# Patient Record
Sex: FEMALE | Race: BLACK OR AFRICAN AMERICAN | NOT HISPANIC OR LATINO | URBAN - METROPOLITAN AREA
[De-identification: names, ages, dates, MRNs, and addresses within clinical notes are randomized per-mention and may not be internally consistent; named-entity substitution may affect disease eponyms.]

---

## 2024-07-30 ENCOUNTER — EMERGENCY (EMERGENCY)
Facility: HOSPITAL | Age: 39
LOS: 0 days | Discharge: HOME | End: 2024-07-31
Attending: EMERGENCY MEDICINE
Payer: SELF-PAY

## 2024-07-30 VITALS
SYSTOLIC BLOOD PRESSURE: 116 MMHG | RESPIRATION RATE: 18 BRPM | TEMPERATURE: 98 F | DIASTOLIC BLOOD PRESSURE: 68 MMHG | HEART RATE: 77 BPM | OXYGEN SATURATION: 100 %

## 2024-07-30 VITALS
DIASTOLIC BLOOD PRESSURE: 81 MMHG | HEART RATE: 87 BPM | OXYGEN SATURATION: 100 % | RESPIRATION RATE: 18 BRPM | SYSTOLIC BLOOD PRESSURE: 142 MMHG | TEMPERATURE: 99 F

## 2024-07-30 DIAGNOSIS — H53.8 OTHER VISUAL DISTURBANCES: ICD-10-CM

## 2024-07-30 DIAGNOSIS — R51.9 HEADACHE, UNSPECIFIED: ICD-10-CM

## 2024-07-30 DIAGNOSIS — R10.31 RIGHT LOWER QUADRANT PAIN: ICD-10-CM

## 2024-07-30 DIAGNOSIS — Z83.3 FAMILY HISTORY OF DIABETES MELLITUS: ICD-10-CM

## 2024-07-30 DIAGNOSIS — E11.65 TYPE 2 DIABETES MELLITUS WITH HYPERGLYCEMIA: ICD-10-CM

## 2024-07-30 LAB
ACETONE SERPL-MCNC: NEGATIVE — SIGNIFICANT CHANGE UP
ALBUMIN SERPL ELPH-MCNC: 3.8 G/DL — SIGNIFICANT CHANGE UP (ref 3.3–5)
ALP SERPL-CCNC: 72 U/L — SIGNIFICANT CHANGE UP (ref 40–120)
ALT FLD-CCNC: 29 U/L — SIGNIFICANT CHANGE UP (ref 12–78)
ANION GAP SERPL CALC-SCNC: 8 MMOL/L — SIGNIFICANT CHANGE UP (ref 5–17)
APPEARANCE UR: CLEAR — SIGNIFICANT CHANGE UP
AST SERPL-CCNC: 24 U/L — SIGNIFICANT CHANGE UP (ref 15–37)
BASE EXCESS BLDV CALC-SCNC: -2.9 MMOL/L — LOW (ref -2–3)
BASOPHILS # BLD AUTO: 0.03 K/UL — SIGNIFICANT CHANGE UP (ref 0–0.2)
BASOPHILS NFR BLD AUTO: 0.3 % — SIGNIFICANT CHANGE UP (ref 0–2)
BILIRUB SERPL-MCNC: 0.2 MG/DL — SIGNIFICANT CHANGE UP (ref 0.2–1.2)
BILIRUB UR-MCNC: NEGATIVE — SIGNIFICANT CHANGE UP
BUN SERPL-MCNC: 16 MG/DL — SIGNIFICANT CHANGE UP (ref 7–23)
CALCIUM SERPL-MCNC: 9.6 MG/DL — SIGNIFICANT CHANGE UP (ref 8.5–10.1)
CHLORIDE SERPL-SCNC: 102 MMOL/L — SIGNIFICANT CHANGE UP (ref 96–108)
CO2 SERPL-SCNC: 24 MMOL/L — SIGNIFICANT CHANGE UP (ref 22–31)
COLOR SPEC: YELLOW — SIGNIFICANT CHANGE UP
CREAT SERPL-MCNC: 1.11 MG/DL — SIGNIFICANT CHANGE UP (ref 0.5–1.3)
DIFF PNL FLD: NEGATIVE — SIGNIFICANT CHANGE UP
EGFR: 64 ML/MIN/1.73M2 — SIGNIFICANT CHANGE UP
EOSINOPHIL # BLD AUTO: 0.08 K/UL — SIGNIFICANT CHANGE UP (ref 0–0.5)
EOSINOPHIL NFR BLD AUTO: 0.8 % — SIGNIFICANT CHANGE UP (ref 0–6)
GAS PNL BLDV: SIGNIFICANT CHANGE UP
GLUCOSE SERPL-MCNC: 338 MG/DL — HIGH (ref 70–99)
GLUCOSE UR QL: >=1000 MG/DL
HCG SERPL-ACNC: <1 MIU/ML — SIGNIFICANT CHANGE UP
HCO3 BLDV-SCNC: 22 MMOL/L — SIGNIFICANT CHANGE UP (ref 22–29)
HCT VFR BLD CALC: 36.3 % — SIGNIFICANT CHANGE UP (ref 34.5–45)
HGB BLD-MCNC: 12.5 G/DL — SIGNIFICANT CHANGE UP (ref 11.5–15.5)
IMM GRANULOCYTES NFR BLD AUTO: 0.3 % — SIGNIFICANT CHANGE UP (ref 0–0.9)
KETONES UR-MCNC: ABNORMAL MG/DL
LEUKOCYTE ESTERASE UR-ACNC: NEGATIVE — SIGNIFICANT CHANGE UP
LYMPHOCYTES # BLD AUTO: 3.15 K/UL — SIGNIFICANT CHANGE UP (ref 1–3.3)
LYMPHOCYTES # BLD AUTO: 30.4 % — SIGNIFICANT CHANGE UP (ref 13–44)
MAGNESIUM SERPL-MCNC: 1.6 MG/DL — SIGNIFICANT CHANGE UP (ref 1.6–2.6)
MCHC RBC-ENTMCNC: 30.2 PG — SIGNIFICANT CHANGE UP (ref 27–34)
MCHC RBC-ENTMCNC: 34.4 GM/DL — SIGNIFICANT CHANGE UP (ref 32–36)
MCV RBC AUTO: 87.7 FL — SIGNIFICANT CHANGE UP (ref 80–100)
MONOCYTES # BLD AUTO: 0.48 K/UL — SIGNIFICANT CHANGE UP (ref 0–0.9)
MONOCYTES NFR BLD AUTO: 4.6 % — SIGNIFICANT CHANGE UP (ref 2–14)
NEUTROPHILS # BLD AUTO: 6.58 K/UL — SIGNIFICANT CHANGE UP (ref 1.8–7.4)
NEUTROPHILS NFR BLD AUTO: 63.6 % — SIGNIFICANT CHANGE UP (ref 43–77)
NITRITE UR-MCNC: NEGATIVE — SIGNIFICANT CHANGE UP
PCO2 BLDV: 38 MMHG — LOW (ref 39–42)
PH BLDV: 7.37 — SIGNIFICANT CHANGE UP (ref 7.32–7.43)
PH UR: 5.5 — SIGNIFICANT CHANGE UP (ref 5–8)
PLATELET # BLD AUTO: 360 K/UL — SIGNIFICANT CHANGE UP (ref 150–400)
PO2 BLDV: 73 MMHG — HIGH (ref 25–45)
POTASSIUM SERPL-MCNC: 4.4 MMOL/L — SIGNIFICANT CHANGE UP (ref 3.5–5.3)
POTASSIUM SERPL-SCNC: 4.4 MMOL/L — SIGNIFICANT CHANGE UP (ref 3.5–5.3)
PROT SERPL-MCNC: 8 GM/DL — SIGNIFICANT CHANGE UP (ref 6–8.3)
PROT UR-MCNC: NEGATIVE MG/DL — SIGNIFICANT CHANGE UP
RBC # BLD: 4.14 M/UL — SIGNIFICANT CHANGE UP (ref 3.8–5.2)
RBC # FLD: 11.9 % — SIGNIFICANT CHANGE UP (ref 10.3–14.5)
SAO2 % BLDV: 84 % — SIGNIFICANT CHANGE UP (ref 67–88)
SODIUM SERPL-SCNC: 134 MMOL/L — LOW (ref 135–145)
SP GR SPEC: 1.02 — SIGNIFICANT CHANGE UP (ref 1–1.03)
UROBILINOGEN FLD QL: 0.2 MG/DL — SIGNIFICANT CHANGE UP (ref 0.2–1)
WBC # BLD: 10.35 K/UL — SIGNIFICANT CHANGE UP (ref 3.8–10.5)
WBC # FLD AUTO: 10.35 K/UL — SIGNIFICANT CHANGE UP (ref 3.8–10.5)

## 2024-07-30 PROCEDURE — 85025 COMPLETE CBC W/AUTO DIFF WBC: CPT

## 2024-07-30 PROCEDURE — 93010 ELECTROCARDIOGRAM REPORT: CPT

## 2024-07-30 PROCEDURE — 81003 URINALYSIS AUTO W/O SCOPE: CPT

## 2024-07-30 PROCEDURE — 82962 GLUCOSE BLOOD TEST: CPT

## 2024-07-30 PROCEDURE — 99285 EMERGENCY DEPT VISIT HI MDM: CPT | Mod: 25

## 2024-07-30 PROCEDURE — 80053 COMPREHEN METABOLIC PANEL: CPT

## 2024-07-30 PROCEDURE — 71045 X-RAY EXAM CHEST 1 VIEW: CPT

## 2024-07-30 PROCEDURE — 82803 BLOOD GASES ANY COMBINATION: CPT

## 2024-07-30 PROCEDURE — 82009 KETONE BODYS QUAL: CPT

## 2024-07-30 PROCEDURE — 71045 X-RAY EXAM CHEST 1 VIEW: CPT | Mod: 26

## 2024-07-30 PROCEDURE — 36415 COLL VENOUS BLD VENIPUNCTURE: CPT

## 2024-07-30 PROCEDURE — 83735 ASSAY OF MAGNESIUM: CPT

## 2024-07-30 PROCEDURE — 84702 CHORIONIC GONADOTROPIN TEST: CPT

## 2024-07-30 PROCEDURE — 99285 EMERGENCY DEPT VISIT HI MDM: CPT

## 2024-07-30 PROCEDURE — 93005 ELECTROCARDIOGRAM TRACING: CPT

## 2024-07-30 RX ORDER — SODIUM CHLORIDE 0.9 % (FLUSH) 0.9 %
1000 SYRINGE (ML) INJECTION ONCE
Refills: 0 | Status: COMPLETED | OUTPATIENT
Start: 2024-07-30 | End: 2024-07-30

## 2024-07-30 RX ORDER — INSULIN REGULAR, HUMAN 100/ML
6 VIAL (ML) INJECTION ONCE
Refills: 0 | Status: COMPLETED | OUTPATIENT
Start: 2024-07-30 | End: 2024-07-30

## 2024-07-30 RX ORDER — METFORMIN HYDROCHLORIDE 850 MG/1
500 TABLET, FILM COATED ORAL ONCE
Refills: 0 | Status: COMPLETED | OUTPATIENT
Start: 2024-07-30 | End: 2024-07-30

## 2024-07-30 RX ORDER — METFORMIN HYDROCHLORIDE 850 MG/1
1 TABLET, FILM COATED ORAL
Qty: 42 | Refills: 0
Start: 2024-07-30 | End: 2024-08-19

## 2024-07-30 RX ADMIN — METFORMIN HYDROCHLORIDE 500 MILLIGRAM(S): 850 TABLET, FILM COATED ORAL at 23:31

## 2024-07-30 RX ADMIN — Medication 6 UNIT(S): at 21:21

## 2024-07-30 RX ADMIN — Medication 1000 MILLILITER(S): at 20:57

## 2024-07-30 NOTE — ED PROVIDER NOTE - OBJECTIVE STATEMENT
39 y/o F with no pertinent PMHx  presents to the ED BIB private car c/o hyperglycemia to 455. Endorses increased thirst, weakness, R lower abd pain, urinary frequency, and SOB. Denies nausea, vomiting, diarrhea, fever. Currently c/o HA and mild b/l blurry vision. States she just moved here from [facility] on 7/1 and has not established care with any doctors here yet.  in triage. Pt speaks Welsh Bahamian Creole, cousin at bedside translating. States pt sometimes has COREY. Family hx of DM and HTN, but pt does not have known diagnosis. 39 y/o F with no pertinent PMHx, BIB private car to ED c/o hyperglycemia to 455. Endorses increased thirst, weakness, R lower abd pain, urinary frequency/polyuria, and mild SOB. Denies nausea, vomiting, diarrhea, fever. Currently c/o HA and mild b/l blurry vision. States she just moved here from [facility] on 7/1 and has not established care with any doctors here yet.  in triage. Pt speaks [place] [facility] Creole, cousin at bedside translating. States pt sometimes has COREY. Family hx of DM and HTN, but pt does not have any known diagnosis.

## 2024-07-30 NOTE — ED PROVIDER NOTE - NSFOLLOWUPCLINICS_GEN_ALL_ED_FT
[facility] [id number] [place] [address]  Phone: [phone]  Fax:

## 2024-07-30 NOTE — ED ADULT NURSE NOTE - OBJECTIVE STATEMENT
pt presents to the ER for fatigue, lightheaded and dizziness. pt reports being recently dx with type 2 DM, but has not yet been described medication. 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 denies any other medical complaints.

## 2024-07-30 NOTE — ED ADULT TRIAGE NOTE - CHIEF COMPLAINT QUOTE
Patient presents to the ER with complaints of increased thirst, weakness, right lower abdominal pain, urinating a lot, dry mouth, blood glucose of 455 at home, and shortness of breath. Patient states she just moved here July 1st from [facility] and has not seen a doctor here. Patient reports having history of "respiratory issue." Patient presents to the ER with complaints of increased thirst, weakness, right lower abdominal pain, urinating a lot, dry mouth, blood glucose of 455 at home, and shortness of breath. Patient states she just moved here July 1st from [facility] and has not seen a doctor here. Patient reports having history of "respiratory issue." Blood glucose 430

## 2024-07-30 NOTE — ED PROVIDER NOTE - CLINICAL SUMMARY MEDICAL DECISION MAKING FREE TEXT BOX
37 y/o F with no pertinent PMHx  presents to the ED BIB private car c/o hyperglycemia to 455. Endorses increased thirst, weakness, R lower abd pain, urinary frequency, and SOB. Denies nausea, vomiting, diarrhea, fever. Currently c/o HA and mild b/l blurry vision. BGM in triage 430.   Clinically suspect new onset diabetes, r/o DKA.   Plan: BGM Q2 hours, labs including VBG, preg test, urine, serum acetone, EKG, CXR, IV fluid, Humulin subcu, monitor, observe, reassess. 37 y/o F with no pertinent PMHx  presents to the ED BIB private car c/o hyperglycemia to 455. Endorses increased thirst, weakness, R lower abd pain, urinary frequency, and SOB. Denies nausea, vomiting, diarrhea, fever. Currently c/o HA and mild b/l blurry vision. BGM in triage 430.   Clinically suspect new onset diabetes, r/o DKA.   Plan: BGM Q2 hours, labs including VBG, preg test, urine, serum acetone, EKG, CXR, IV fluid, Humulin subcu, monitor, observe, reassess.    BLAZE Gar MD:  Chest x-ray wet read: COLLEEN.    Labs results notable for hyperglycemia 338 with no ketones nor acidosis.  UA negative.  Repeat BGM and 200s.  No current clinical indication  for inpatient management, patient stable for DC on p.o. metformin with outpatient follow-up.  This was discussed with family and patient, the expressed their understanding and agree with outpatient management plan. 37 y/o F with no pertinent PMHx, BIB private car c/o hyperglycemia to 455. Endorses increased thirst, weakness, R lower abd pain, urinary frequency/polyuria, and SOB. Denies nausea, vomiting, diarrhea, fever. Currently c/o HA and mild b/l blurry vision. BGM in triage 430.   Clinically suspect new onset diabetes, r/o DKA.   Plan: BGM Q2 hours, labs including VBG, preg test, urine, serum acetone, EKG, CXR, IV fluid, Humulin R SQ, monitor, observe, reassess.    BLAZE Gar MD:  Chest x-ray wet read: COLLEEN.    Labs results notable for hyperglycemia 338 with no ketones nor acidosis.  U/A negative other than + Glc .  Repeat BGM s/p IVF & SQ Humulin in lower 200s.  No current clinical indication  for inpatient management, patient stable for DC on p.o. Metformin with outpatient follow-up.  This was discussed with family and patient, they expressed their understanding and agree with outpatient management plan.

## 2024-07-30 NOTE — ED ADULT NURSE NOTE - CHIEF COMPLAINT QUOTE
Patient presents to the ER with complaints of increased thirst, weakness, right lower abdominal pain, urinating a lot, dry mouth, blood glucose of 455 at home, and shortness of breath. Patient states she just moved here July 1st from [facility] and has not seen a doctor here. Patient reports having history of "respiratory issue." Blood glucose 430

## 2024-07-30 NOTE — ED PROVIDER NOTE - PHYSICAL EXAMINATION
Gen: BF adult, no respiratory discomfort, mildly ill but nontoxic  Head: NC/AT  Eyes: PERRL, EOMI  ENT: oropharynx clear, mucous membranes slightly dry  Card: regular rate and rhythm, normal radial pulse  Resp: Breath sounds clear bilaterally, respirations grossly normal  : deferred, no CVAT  Abd: soft, non-tender, bowel sounds hypoactive, normal pitch, no rebound, guarding or mass   Msk: MAEx4 without focal deformities, tenderness or swelling, SLR 35 degrees without pain, normal motor  Skin: no tactile warmth, no rash, no diaphoresis  Neuro: Alert and oriented x3, no focal deficits, no motor or sensory deficits, CN intact, normal speech Gen: BF adult, no respiratory discomfort, mildly ill but nontoxic  Head: NC/AT  Eyes: PERRL, EOMI  ENT: oropharynx clear, mucous membranes slightly dry, no ketotic breath  Card: regular rate and rhythm, normal radial pulse  Resp: Breath sounds clear bilaterally, respirations grossly normal, no hyperpnea/tachypnea  : deferred, no CVAT  Abd: soft, non-tender, bowel sounds hypoactive, normal pitch, no rebound, guarding or mass   Msk: MAEx4 without focal deformities, tenderness or swelling, SLR 35 degrees without pain, normal motor  Skin: no tactile warmth, no rash, no diaphoresis  Neuro: Alert and oriented x3, no focal deficits, no motor or sensory deficits, CN intact, normal speech

## 2024-07-30 NOTE — ED PROVIDER NOTE - PATIENT PORTAL LINK FT
You can access the FollowMyHealth Patient Portal offered by [facility] by registering at the following website: [email or web address]. By joining [employer]’s FollowMyHealth portal, you will also be able to view your health information using other applications (apps) compatible with our system.

## 2024-07-30 NOTE — ED ADULT TRIAGE NOTE - SOURCE OF INFORMATION
- Seen on UA  - Patient treated earlier in pregnancy, unsure if her partner has been treated  - Held flagyl given nauseating SEs, will administer now that N/V better controlled   Patient

## 2024-07-30 NOTE — ED ADULT NURSE NOTE - NSFALLUNIVINTERV_ED_ALL_ED
Bed/Stretcher in lowest position, wheels locked, appropriate side rails in place/Call bell, personal items and telephone in reach/Instruct patient to call for assistance before getting out of bed/chair/stretcher/Non-slip footwear applied when patient is off stretcher/[place] to call system/Physically safe environment - no spills, clutter or unnecessary equipment/Purposeful proactive rounding/Room/bathroom lighting operational, light cord in reach